# Patient Record
Sex: FEMALE | Employment: FULL TIME
[De-identification: names, ages, dates, MRNs, and addresses within clinical notes are randomized per-mention and may not be internally consistent; named-entity substitution may affect disease eponyms.]

---

## 2017-10-22 ENCOUNTER — HEALTH MAINTENANCE LETTER (OUTPATIENT)
Age: 37
End: 2017-10-22

## 2018-11-02 ENCOUNTER — RADIANT APPOINTMENT (OUTPATIENT)
Dept: GENERAL RADIOLOGY | Facility: CLINIC | Age: 38
End: 2018-11-02
Attending: PHYSICIAN ASSISTANT
Payer: COMMERCIAL

## 2018-11-02 ENCOUNTER — OFFICE VISIT (OUTPATIENT)
Dept: INTERNAL MEDICINE | Facility: CLINIC | Age: 38
End: 2018-11-02
Payer: COMMERCIAL

## 2018-11-02 VITALS
BODY MASS INDEX: 32.18 KG/M2 | OXYGEN SATURATION: 98 % | SYSTOLIC BLOOD PRESSURE: 118 MMHG | WEIGHT: 170.3 LBS | HEART RATE: 80 BPM | DIASTOLIC BLOOD PRESSURE: 70 MMHG | TEMPERATURE: 98.3 F

## 2018-11-02 DIAGNOSIS — S92.351A CLOSED DISPLACED FRACTURE OF FIFTH METATARSAL BONE OF RIGHT FOOT, INITIAL ENCOUNTER: Primary | ICD-10-CM

## 2018-11-02 DIAGNOSIS — S99.921A FOOT INJURY, RIGHT, INITIAL ENCOUNTER: ICD-10-CM

## 2018-11-02 PROCEDURE — 73630 X-RAY EXAM OF FOOT: CPT | Mod: RT

## 2018-11-02 PROCEDURE — 99214 OFFICE O/P EST MOD 30 MIN: CPT | Performed by: PHYSICIAN ASSISTANT

## 2018-11-02 RX ORDER — TRAMADOL HYDROCHLORIDE 50 MG/1
50 TABLET ORAL EVERY 6 HOURS PRN
Qty: 10 TABLET | Refills: 0 | Status: SHIPPED | OUTPATIENT
Start: 2018-11-02 | End: 2019-09-13

## 2018-11-02 NOTE — PROGRESS NOTES
SUBJECTIVE:   Elsie Gottlieb is a 38 year old female who presents to clinic today for the following health issues:      Patient is here today because she was going down the stairs this morning and twisted and heard a pop and now right foot is black and blue and swollen.     Musculoskeletal problem/pain      Duration: this am     Description  Location: right lateral foot     Intensity:  severe    Accompanying signs and symptoms: swelling and bruising    Very painful not able to bear weight     History  Previous similar problem: YES- fx left foot in 2014  Previous evaluation:  none    Precipitating or alleviating factors:  Trauma or overuse: YES  Aggravating factors include: walking    Therapies tried and outcome: nothing      Problem list and histories reviewed & adjusted, as indicated.  Additional history: as documented    Labs reviewed in EPIC    Reviewed and updated as needed this visit by clinical staff  Allergies  Meds       Reviewed and updated as needed this visit by Provider  Allergies  Meds         ROS:  Constitutional, HEENT, cardiovascular, pulmonary, gi and gu systems are negative, except as otherwise noted.    OBJECTIVE:     /70  Pulse 80  Temp 98.3  F (36.8  C) (Oral)  Wt 170 lb 4.8 oz (77.2 kg)  LMP 10/17/2018 (Approximate)  SpO2 98%  BMI 32.18 kg/m2  Body mass index is 32.18 kg/(m^2).  GENERAL: healthy, alert and no distress  RESP: lungs clear to auscultation - no rales, rhonchi or wheezes  CV: regular rates and rhythm and normal S1 S2, no S3 or S4  MS: right foot, swelling ecchymosis lateral 5th metatarsal area   SKIN: no suspicious lesions or rashes    Xray  Prelim- displaced fracture proximal 5th metatarsal     ASSESSMENT/PLAN:             1. Closed displaced fracture of fifth metatarsal bone of right foot, initial encounter    - order for DME; Equipment being ordered: crutches  And CAM walker boot  Dispense: 1 each; Refill: 0  - ORTHOPEDICS ADULT REFERRAL  - traMADol (ULTRAM) 50  MG tablet; Take 1 tablet (50 mg) by mouth every 6 hours as needed for severe pain  Dispense: 10 tablet; Refill: 0    2. Foot injury, right, initial encounter    - XR Foot Right G/E 3 Views; Future    Icing,  See ortho next week for further management.     25 minutes spent with patient > 50% of time on counseling and plan of care.      Meche Patino PA-C  [facility]

## 2018-11-02 NOTE — MR AVS SNAPSHOT
After Visit Summary   11/2/2018    Elsie Gottlieb    MRN: [id number]           Patient Information     Date Of Birth          1980        Visit Information        Provider Department      11/2/2018 2:20 PM Meche Patino PA-C [facility]        Today's Diagnoses     Closed displaced fracture of fifth metatarsal bone of right foot, initial encounter    -  1    Foot injury, right, initial encounter           Follow-ups after your visit        Additional Services     ORTHOPEDICS ADULT REFERRAL       Your provider has referred you to: [facility] [phone]   [email or web address]    Please be aware that coverage of these services is subject to the terms and limitations of your health insurance plan.  Call member services at your health plan with any benefit or coverage questions.      Please bring the following to your appointment:    >>   Any x-rays, CTs or MRIs which have been performed.  Contact the facility where they were done to arrange for  prior to your scheduled appointment.    >>   List of current medications   >>   This referral request   >>   Any documents/labs given to you for this referral                  Who to contact     If you have questions or need follow up information about today's clinic visit or your schedule please contact [facility] directly at [phone].  Normal or non-critical lab and imaging results will be communicated to you by MyChart, letter or phone within 4 business days after the clinic has received the results. If you do not hear from us within 7 days, please contact the clinic through MyChart or phone. If you have a critical or abnormal lab result, we will notify you by phone as soon as possible.  Submit refill requests through [employer] or call your pharmacy and they will forward the refill request to us. Please allow 3 business days for your refill to be  "completed.          Additional Information About Your Visit        [employer] Information     [employer] lets you send messages to your doctor, view your test results, renew your prescriptions, schedule appointments and more. To sign up, go to [email or web address] . Click on \"Log in\" on the left side of the screen, which will take you to the Welcome page. Then click on \"Sign up Now\" on the right side of the page.     You will be asked to enter the access code listed below, as well as some personal information. Please follow the directions to create your username and password.     Your access code is: [email or web address]  Expires: 2019  2:53 PM     Your access code will  in 90 days. If you need help or a new code, please call your [facility] clinic or [phone].        Care EveryWhere ID     This is your Care EveryWhere ID. This could be used by other organizations to access your [facility] medical records  [id number]        Your Vitals Were     Pulse Temperature Last Period Pulse Oximetry BMI (Body Mass Index)       80 98.3  F (36.8  C) (Oral) 10/17/2018 (Approximate) 98% 32.18 kg/m2        Blood Pressure from Last 3 Encounters:   18 118/70   16 122/76   10/15/15 105/55    Weight from Last 3 Encounters:   18 170 lb 4.8 oz (77.2 kg)   16 164 lb (74.4 kg)   10/14/15 170 lb (77.1 kg)              We Performed the Following     ORTHOPEDICS ADULT REFERRAL          Today's Medication Changes          These changes are accurate as of 18  2:53 PM.  If you have any questions, ask your nurse or doctor.               Start taking these medicines.        Dose/Directions    order for DME   Used for:  Closed displaced fracture of fifth metatarsal bone of right foot, initial encounter   Started by:  Meche Patino PA-C        Equipment being ordered: crutches And CAM walker boot   Quantity:  1 each   Refills:  0            Where to get your medicines      Some of these will need a paper " prescription and others can be bought over the counter.  Ask your nurse if you have questions.     Bring a paper prescription for each of these medications     order for DME                Primary Care Provider Office Phone # Fax #    Scott Hollingsworth [phone] [address] [place] [address]        Equal Access to Services     THEERSE MATTHEWS : Hadii aad ku hadasho Soomaali, waaxda luqadaha, qaybta kaalmada adeegyada, waxay sammyin hayaan adeeg kharash laailynn . So [facility] [phone].    ATENCIÓN: Si habla español, tiene a rosales disposición servicios gratuitos de asistencia lingüística. Llame al [phone].    We comply with applicable federal civil rights laws and [place] laws. We do not discriminate on the basis of race, color, national origin, age, disability, sex, sexual orientation, or gender identity.            Thank you!     Thank you for choosing [facility]  for your care. Our goal is always to provide you with excellent care. Hearing back from our patients is one way we can continue to improve our services. Please take a few minutes to complete the written survey that you may receive in the mail after your visit with us. Thank you!             Your Updated Medication List - Protect others around you: Learn how to safely use, store and throw away your medicines at www.disposemymeds.org.          This list is accurate as of 11/2/18  2:53 PM.  Always use your most recent med list.                   Brand Name Dispense Instructions for use Diagnosis    HYDROcodone-acetaminophen 5-325 MG per tablet    NORCO    30 tablet    Take 1-2 tablets by mouth every 4 hours as needed for other (Moderate to Severe Pain)    Acute appendicitis with localized peritonitis       multivitamin, therapeutic with minerals Tabs tablet      Take 1 tablet by mouth daily        order for DME     1 each    Equipment being ordered: crutches And CAM walker boot    Closed displaced  fracture of fifth metatarsal bone of right foot, initial encounter

## 2019-09-13 ENCOUNTER — ANCILLARY PROCEDURE (OUTPATIENT)
Dept: MAMMOGRAPHY | Facility: CLINIC | Age: 39
End: 2019-09-13
Attending: PHYSICIAN ASSISTANT
Payer: COMMERCIAL

## 2019-09-13 ENCOUNTER — OFFICE VISIT (OUTPATIENT)
Dept: INTERNAL MEDICINE | Facility: CLINIC | Age: 39
End: 2019-09-13
Payer: COMMERCIAL

## 2019-09-13 VITALS
OXYGEN SATURATION: 99 % | DIASTOLIC BLOOD PRESSURE: 70 MMHG | SYSTOLIC BLOOD PRESSURE: 106 MMHG | HEIGHT: 61 IN | BODY MASS INDEX: 31.68 KG/M2 | WEIGHT: 167.8 LBS | HEART RATE: 63 BPM | TEMPERATURE: 98.5 F

## 2019-09-13 DIAGNOSIS — Z13.1 SCREENING FOR DIABETES MELLITUS: ICD-10-CM

## 2019-09-13 DIAGNOSIS — Z13.220 LIPID SCREENING: ICD-10-CM

## 2019-09-13 DIAGNOSIS — Z12.31 VISIT FOR SCREENING MAMMOGRAM: ICD-10-CM

## 2019-09-13 DIAGNOSIS — Z00.00 ROUTINE GENERAL MEDICAL EXAMINATION AT A HEALTH CARE FACILITY: Primary | ICD-10-CM

## 2019-09-13 DIAGNOSIS — Z12.31 ENCOUNTER FOR SCREENING MAMMOGRAM FOR BREAST CANCER: ICD-10-CM

## 2019-09-13 DIAGNOSIS — E78.5 HYPERLIPIDEMIA LDL GOAL <100: ICD-10-CM

## 2019-09-13 DIAGNOSIS — Z11.4 ENCOUNTER FOR SCREENING FOR HIV: ICD-10-CM

## 2019-09-13 DIAGNOSIS — Z12.4 SCREENING FOR MALIGNANT NEOPLASM OF CERVIX: ICD-10-CM

## 2019-09-13 DIAGNOSIS — R41.3 MEMORY LOSS: ICD-10-CM

## 2019-09-13 DIAGNOSIS — Z23 NEED FOR PROPHYLACTIC VACCINATION AND INOCULATION AGAINST INFLUENZA: ICD-10-CM

## 2019-09-13 LAB
ALBUMIN SERPL-MCNC: 3.8 G/DL (ref 3.4–5)
ALP SERPL-CCNC: 75 U/L (ref 40–150)
ALT SERPL W P-5'-P-CCNC: 21 U/L (ref 0–50)
ANION GAP SERPL CALCULATED.3IONS-SCNC: 3 MMOL/L (ref 3–14)
AST SERPL W P-5'-P-CCNC: 8 U/L (ref 0–45)
BASOPHILS # BLD AUTO: 0 10E9/L (ref 0–0.2)
BASOPHILS NFR BLD AUTO: 0.2 %
BILIRUB SERPL-MCNC: 1.1 MG/DL (ref 0.2–1.3)
BUN SERPL-MCNC: 8 MG/DL (ref 7–30)
CALCIUM SERPL-MCNC: 9.1 MG/DL (ref 8.5–10.1)
CHLORIDE SERPL-SCNC: 107 MMOL/L (ref 94–109)
CHOLEST SERPL-MCNC: 316 MG/DL
CO2 SERPL-SCNC: 27 MMOL/L (ref 20–32)
CREAT SERPL-MCNC: 0.58 MG/DL (ref 0.52–1.04)
DIFFERENTIAL METHOD BLD: NORMAL
EOSINOPHIL # BLD AUTO: 0.1 10E9/L (ref 0–0.7)
EOSINOPHIL NFR BLD AUTO: 1.2 %
ERYTHROCYTE [DISTWIDTH] IN BLOOD BY AUTOMATED COUNT: 13.6 % (ref 10–15)
GFR SERPL CREATININE-BSD FRML MDRD: >90 ML/MIN/{1.73_M2}
GLUCOSE SERPL-MCNC: 92 MG/DL (ref 70–99)
HCT VFR BLD AUTO: 38.1 % (ref 35–47)
HDLC SERPL-MCNC: 55 MG/DL
HGB BLD-MCNC: 12.8 G/DL (ref 11.7–15.7)
LDLC SERPL CALC-MCNC: 233 MG/DL
LYMPHOCYTES # BLD AUTO: 1.6 10E9/L (ref 0.8–5.3)
LYMPHOCYTES NFR BLD AUTO: 38.6 %
MCH RBC QN AUTO: 29.5 PG (ref 26.5–33)
MCHC RBC AUTO-ENTMCNC: 33.6 G/DL (ref 31.5–36.5)
MCV RBC AUTO: 88 FL (ref 78–100)
MONOCYTES # BLD AUTO: 0.3 10E9/L (ref 0–1.3)
MONOCYTES NFR BLD AUTO: 8 %
NEUTROPHILS # BLD AUTO: 2.2 10E9/L (ref 1.6–8.3)
NEUTROPHILS NFR BLD AUTO: 52 %
NONHDLC SERPL-MCNC: 261 MG/DL
PLATELET # BLD AUTO: 243 10E9/L (ref 150–450)
POTASSIUM SERPL-SCNC: 3.8 MMOL/L (ref 3.4–5.3)
PROT SERPL-MCNC: 7.3 G/DL (ref 6.8–8.8)
RBC # BLD AUTO: 4.34 10E12/L (ref 3.8–5.2)
SODIUM SERPL-SCNC: 137 MMOL/L (ref 133–144)
TRIGL SERPL-MCNC: 141 MG/DL
VIT B12 SERPL-MCNC: 517 PG/ML (ref 193–986)
WBC # BLD AUTO: 4.3 10E9/L (ref 4–11)

## 2019-09-13 PROCEDURE — 87389 HIV-1 AG W/HIV-1&-2 AB AG IA: CPT | Performed by: PHYSICIAN ASSISTANT

## 2019-09-13 PROCEDURE — 90686 IIV4 VACC NO PRSV 0.5 ML IM: CPT | Performed by: PHYSICIAN ASSISTANT

## 2019-09-13 PROCEDURE — 36415 COLL VENOUS BLD VENIPUNCTURE: CPT | Performed by: PHYSICIAN ASSISTANT

## 2019-09-13 PROCEDURE — 99214 OFFICE O/P EST MOD 30 MIN: CPT | Mod: 25 | Performed by: PHYSICIAN ASSISTANT

## 2019-09-13 PROCEDURE — G0123 SCREEN CERV/VAG THIN LAYER: HCPCS | Performed by: PHYSICIAN ASSISTANT

## 2019-09-13 PROCEDURE — 90471 IMMUNIZATION ADMIN: CPT | Performed by: PHYSICIAN ASSISTANT

## 2019-09-13 PROCEDURE — 77067 SCR MAMMO BI INCL CAD: CPT | Mod: TC

## 2019-09-13 PROCEDURE — 86780 TREPONEMA PALLIDUM: CPT | Performed by: PHYSICIAN ASSISTANT

## 2019-09-13 PROCEDURE — 99396 PREV VISIT EST AGE 40-64: CPT | Mod: 25 | Performed by: PHYSICIAN ASSISTANT

## 2019-09-13 PROCEDURE — 82607 VITAMIN B-12: CPT | Performed by: PHYSICIAN ASSISTANT

## 2019-09-13 PROCEDURE — 80061 LIPID PANEL: CPT | Performed by: PHYSICIAN ASSISTANT

## 2019-09-13 PROCEDURE — 80053 COMPREHEN METABOLIC PANEL: CPT | Performed by: PHYSICIAN ASSISTANT

## 2019-09-13 PROCEDURE — 85025 COMPLETE CBC W/AUTO DIFF WBC: CPT | Performed by: PHYSICIAN ASSISTANT

## 2019-09-13 PROCEDURE — 87624 HPV HI-RISK TYP POOLED RSLT: CPT | Performed by: PHYSICIAN ASSISTANT

## 2019-09-13 ASSESSMENT — MIFFLIN-ST. JEOR: SCORE: 1363.52

## 2019-09-13 NOTE — NURSING NOTE
"Chief Complaint   Patient presents with     Physical     wants to discuss sleep issues     /70   Pulse 63   Temp 98.5  F (36.9  C) (Oral)   Ht 1.549 m (5' 1\")   Wt 76.1 kg (167 lb 12.8 oz)   SpO2 99%   BMI 31.71 kg/m   Estimated body mass index is 31.71 kg/m  as calculated from the following:    Height as of this encounter: 1.549 m (5' 1\").    Weight as of this encounter: 76.1 kg (167 lb 12.8 oz).        Health Maintenance due pending provider review:  Pap Smear    Discuss with provider    Moni Osborn CMA  "

## 2019-09-13 NOTE — PROGRESS NOTES
SUBJECTIVE:   CC: Elsie Gottlieb is an 39 year old woman who presents for preventive health visit.     Healthy Habits:     Getting at least 3 servings of Calcium per day:  Yes    Bi-annual eye exam:  NO    Dental care twice a year:  Yes    Sleep apnea or symptoms of sleep apnea:  None    Diet:  Regular (no restrictions)    Frequency of exercise:  2-3 days/week    Duration of exercise:  Greater than 60 minutes    Taking medications regularly:  Yes    Medication side effects:  None    PHQ-2 Total Score: 0    Additional concerns today:  No    Patient reports concerns for forgetfulness. She notes at work she is forgetting things. She notes one day she was happy to be done with work early to make it to her ariel class. She drove home instead and it was hours before she recalled she missed her ariel class. She notes feeling warm,difficulty sleeping and dry skin as other symptoms.     Today's PHQ-2 Score:   PHQ-2 ( 1999 Pfizer) 9/13/2019   Q1: Little interest or pleasure in doing things 0   Q2: Feeling down, depressed or hopeless 0   PHQ-2 Score 0   Q1: Little interest or pleasure in doing things Not at all   Q2: Feeling down, depressed or hopeless Not at all   PHQ-2 Score 0       Abuse: Current or Past(Physical, Sexual or Emotional)- NO  Do you feel safe in your environment? YES    Social History     Tobacco Use     Smoking status: Former Smoker     Types: Cigarettes     Smokeless tobacco: Never Used     Tobacco comment: tried smoking as a teenager but never regularly   Substance Use Topics     Alcohol use: No     Alcohol/week: 0.0 oz     If you drink alcohol do you typically have >3 drinks per day or >7 drinks per week? Not applicable    Alcohol Use 9/13/2019   Prescreen: >3 drinks/day or >7 drinks/week? Not Applicable   Prescreen: >3 drinks/day or >7 drinks/week? -   No flowsheet data found.    Reviewed orders with patient.  Reviewed health maintenance and updated orders accordingly - Yes    Mammogram Screening: Patient  "under age 50, mutual decision reflected in health maintenance.       Pertinent mammograms are reviewed under the imaging tab.  History of abnormal Pap smear: NO - age 30-65 PAP every 5 years with negative HPV co-testing recommended  PAP / HPV 8/20/2014   PAP NIL     Reviewed and updated as needed this visit by clinical staff  Tobacco  Allergies  Meds  Med Hx  Surg Hx  Fam Hx  Soc Hx        Reviewed and updated as needed this visit by Provider  Tobacco  Surg Hx  Fam Hx  Soc Hx         Review of Systems  CONSTITUTIONAL: NEGATIVE for fever, chills, change in weight  INTEGUMENTARU/SKIN: NEGATIVE for worrisome rashes, moles or lesions  EYES: NEGATIVE for vision changes or irritation  ENT: NEGATIVE for ear, mouth and throat problems  RESP: NEGATIVE for significant cough or SOB  BREAST: NEGATIVE for masses, tenderness or discharge  CV: NEGATIVE for chest pain, palpitations or peripheral edema  GI: NEGATIVE for nausea, abdominal pain, heartburn, or change in bowel habits  : NEGATIVE for unusual urinary or vaginal symptoms. Periods are regular.  MUSCULOSKELETAL: NEGATIVE for significant arthralgias or myalgia  NEURO: NEGATIVE for weakness, dizziness or paresthesias  PSYCHIATRIC: NEGATIVE for changes in mood or affect     OBJECTIVE:   /70   Pulse 63   Temp 98.5  F (36.9  C) (Oral)   Ht 1.549 m (5' 1\")   Wt 76.1 kg (167 lb 12.8 oz)   SpO2 99%   BMI 31.71 kg/m    Physical Exam  GENERAL: healthy, alert and no distress  EYES: Eyes grossly normal to inspection, PERRL and conjunctivae and sclerae normal  HENT: ear canals and TM's normal, nose and mouth without ulcers or lesions  NECK: no adenopathy, no asymmetry, masses, or scars and thyroid normal to palpation  RESP: lungs clear to auscultation - no rales, rhonchi or wheezes  CV: regular rate and rhythm, normal S1 S2, no S3 or S4, no murmur, click or rub, no peripheral edema and peripheral pulses strong  ABDOMEN: soft, nontender, no hepatosplenomegaly, no " masses and bowel sounds normal  MS: no gross musculoskeletal defects noted, no edema  SKIN: no suspicious lesions or rashes  NEURO: Normal strength and tone, mentation intact and speech normal  PSYCH: mentation appears normal, affect normal/bright    Diagnostic Test Results:  Labs reviewed in Epic    ASSESSMENT/PLAN:   1. Routine general medical examination at a health care facility  - reviewed PMH and health maintenance     2. Encounter for screening mammogram for breast cancer  - reviewed mammogram guidelines, patient wanting to start now vs wait     3. Encounter for screening for HIV  - HIV Antigen Antibody Combo    4. Lipid screening  - Lipid panel reflex to direct LDL Fasting    5. Screening for malignant neoplasm of cervix  - Pap imaged thin layer screen with HPV - recommended age 30 - 65 years (select HPV order below)  - HPV High Risk Types DNA Cervical    6. Screening for diabetes mellitus  - CMP as below     7. Memory loss  - further work up as below along as referral to neurology for further evaluation and treatment   - Vitamin B12  - Comprehensive metabolic panel  - CBC with platelets and differential  - Treponema Abs w Reflex to RPR and Titer  - NEUROLOGY ADULT REFERRAL    8. Need for prophylactic vaccination and inoculation against influenza  - INFLUENZA VACCINE IM > 6 MONTHS VALENT IIV4 [38845]  - Vaccine Administration, Initial [63187]    Tiffanie Alves PA-C  [facility]

## 2019-09-13 NOTE — PROGRESS NOTES
"   SUBJECTIVE:   CC: Elsie Gottlieb is an 39 year old woman who presents for preventive health visit.     Healthy Habits:    Do you get at least three servings of calcium containing foods daily (dairy, green leafy vegetables, etc.)? { :927877::\"yes\"}    Amount of exercise or daily activities, outside of work: { :474439}    Problems taking medications regularly { :791283::\"No\"}    Medication side effects: { :823619::\"No\"}    Have you had an eye exam in the past two years? { :023018}    Do you see a dentist twice per year? { :091164}    Do you have sleep apnea, excessive snoring or daytime drowsiness?{ :258116}  {Outside tests to abstract? :352563}    {additional problems to add (Optional):283602}    Today's PHQ-2 Score:   PHQ-2 ( 1999 Pfizer) 9/13/2019 5/18/2016   Q1: Little interest or pleasure in doing things 0 0   Q2: Feeling down, depressed or hopeless 0 0   PHQ-2 Score 0 0   Q1: Little interest or pleasure in doing things Not at all -   Q2: Feeling down, depressed or hopeless Not at all -   PHQ-2 Score 0 -     {PHQ-2 LOOK IN ASSESSMENTS (Optional) :569395}  Abuse: Current or Past(Physical, Sexual or Emotional)- {YES/NO/NA:112883}  Do you feel safe in your environment? {YES/NO/NA:032816}    Social History     Tobacco Use     Smoking status: Former Smoker     Types: Cigarettes     Smokeless tobacco: Never Used     Tobacco comment: tried smoking as a teenager but never regularly   Substance Use Topics     Alcohol use: No     Alcohol/week: 0.0 oz     If you drink alcohol do you typically have >3 drinks per day or >7 drinks per week? {ETOH :149274}                     Reviewed orders with patient.  Reviewed health maintenance and updated orders accordingly - {Yes/No:590596::\"Yes\"}  {Chronicprobdata (Optional):803333}    {Mammo Decision Support (Optional):040176}    Pertinent mammograms are reviewed under the imaging tab.  History of abnormal Pap smear: {PAP HX:926841}  PAP / HPV 8/20/2014   PAP NIL     Reviewed and " "updated as needed this visit by clinical staff  Tobacco  Allergies  Med Hx  Surg Hx  Fam Hx  Soc Hx        Reviewed and updated as needed this visit by Provider        {HISTORY OPTIONS (Optional):866073}    ROS:  { :075775}    OBJECTIVE:   There were no vitals taken for this visit.  EXAM:  {Exam Choices:390466}    {Diagnostic Test Results (Optional):737243::\"Diagnostic Test Results:\",\"Labs reviewed in Epic\"}    ASSESSMENT/PLAN:   {Diag Picklist:451905}    COUNSELING:   {FEMALE COUNSELING MESSAGES:955480::\"Reviewed preventive health counseling, as reflected in patient instructions\"}    Estimated body mass index is 32.18 kg/m  as calculated from the following:    Height as of 5/18/16: 1.549 m (5' 1\").    Weight as of 11/2/18: 77.2 kg (170 lb 4.8 oz).    {Weight Management Plan (ACO) Complete if BMI is abnormal-  Ages 18-64  BMI >24.9.  Age 65+ with BMI <23 or >30 (Optional):158236}     reports that she has quit smoking. Her smoking use included cigarettes. She has never used smokeless tobacco.  {Tobacco Cessation -- Complete if patient is a smoker (Optional):661381}    Counseling Resources:  ATP IV Guidelines  Pooled Cohorts Equation Calculator  Breast Cancer Risk Calculator  FRAX Risk Assessment  ICSI Preventive Guidelines  Dietary Guidelines for Americans, 2010  USDA's MyPlate  ASA Prophylaxis  Lung CA Screening    Tiffanie Alves PA-C  [facility]  "

## 2019-09-13 NOTE — LETTER
September 23, 2019    Elsie Gottlieb  [address]  [place] [address]    Dear ,  This letter is regarding your recent Pap smear (cervical cancer screening) and Human Papillomavirus (HPV) test.  We are happy to inform you that your Pap smear result is normal. Cervical cancer is closely linked with certain types of HPV. Your results showed no evidence of high-risk HPV.  We recommend you have your next PAP smear and HPV test in 5 years.  You will still need to return to the clinic every year for an annual exam and other preventive tests.  If you have additional questions regarding this result, please call our registered nurse, Christie at [phone].  Sincerely,    Tiffanie Alves PA-C //[email or web address]

## 2019-09-15 LAB — T PALLIDUM AB SER QL: NONREACTIVE

## 2019-09-16 LAB — HIV 1+2 AB+HIV1 P24 AG SERPL QL IA: NONREACTIVE

## 2019-09-18 LAB
COPATH REPORT: NORMAL
FINAL DIAGNOSIS: NORMAL
HPV HR 12 DNA CVX QL NAA+PROBE: NEGATIVE
HPV16 DNA SPEC QL NAA+PROBE: NEGATIVE
HPV18 DNA SPEC QL NAA+PROBE: NEGATIVE
PAP: NORMAL
SPECIMEN DESCRIPTION: NORMAL
SPECIMEN SOURCE CVX/VAG CYTO: NORMAL

## 2019-09-25 ENCOUNTER — TELEPHONE (OUTPATIENT)
Dept: INTERNAL MEDICINE | Facility: CLINIC | Age: 39
End: 2019-09-25

## 2019-09-25 RX ORDER — ATORVASTATIN CALCIUM 40 MG/1
40 TABLET, FILM COATED ORAL DAILY
Qty: 90 TABLET | Refills: 3 | Status: SHIPPED | OUTPATIENT
Start: 2019-09-25 | End: 2020-11-23

## 2019-10-16 NOTE — PROGRESS NOTES
INITIAL NEUROLOGY CONSULTATION    DATE OF VISIT: 10/16/2019  CLINIC LOCATION: [facility]  MRN: [id number]  PATIENT NAME: Elsie Gottlieb  YOB: 1980    PRIMARY CARE PROVIDER: Scott Hollingsworth MD     REASON FOR VISIT:   Chief Complaint   Patient presents with     Memory Loss     started last year she would forget things and words      HISTORY OF PRESENT ILLNESS:                                                    Ms. Elsie Gottlieb is 39 year old ambidextrous female patient with past medical history of hyperlipidemia, who was seen in consultation today requested by Tiffanie Alves PA-C, for memory difficulty.    Per patient's report, she noticed increased forgetfulness for the recent events over the last year.  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  She repeatedly forgot her jacket, cell phone, and other belongings at work.  One time she forgot to put away the expensive knife that disappeared after that.  On one occasion she forgot that she had a class after work and realized it half hour after it started.  She also repeatedly asked the same questions at work regarding recipes and other processes she did not remember.  Now she does not do it because she was able to eventually learn needed information.  Her  and her friends do not notice any problems, but at work she was commented by supervisor and her coworkers regarding her memory issues.  She has word finding difficulties.  No past due bills (her  is in charge of their finances).  She continues to drive without any difficulties.  She denies any safety concerns at home, including left on stove burners, lights, water facets, or keys outside the doors.  She denies any significant mood or personality changes, but admits that work change was quite stressful.  At the present time rates her stress level as mild.  Denies any focal neurological symptoms.  No prior history of significant  brain injuries, seizures, or CNS infections.    Recent labs from September 2019 include elevated LDL (233), normal vitamin B12 (517), unremarkable CMP, normal CBC, and non-reactive treponema/HIV antibodies.  No prior TSH level.    No previous brain imaging.  No additional useful information is available in Care Everywhere, which was reviewed.    Review of Systems - the patient endorses insomnia, weight gain, and depression (denies later symptom on further questioning).  All of them have been previously discussed with other medical providers.  Otherwise, she denies any other complaints on 14-point comprehensive review of systems.  PAST MEDICAL/SURGICAL HISTORY:                                                    I personally reviewed patient's past medical and surgical history with the patient at today's visit.  Patient Active Problem List   Diagnosis     Hyperlipidemia LDL goal <160     PPD positive     Closed fracture of metatarsal bone     Acute appendicitis with localized peritonitis     Past Medical History:   Diagnosis Date     Hyperlipidemia LDL goal <160      Positive PPD      Past Surgical History:   Procedure Laterality Date     CHOLECYSTECTOMY  8/2011     GYN SURGERY  6/2011    tubal ligation     LAPAROSCOPIC APPENDECTOMY N/A 10/14/2015    Procedure: LAPAROSCOPIC APPENDECTOMY;  Surgeon: Wicho Segal MD;  Location:  OR     MEDICATIONS:                                                    I personally reviewed patient's medications and allergies with the patient at today's visit:  atorvastatin (LIPITOR) 40 MG tablet, Take 1 tablet (40 mg) by mouth daily  multivitamin, therapeutic with minerals (THERA-VIT-M) TABS, Take 1 tablet by mouth daily.  ALLERGIES:                                                    No Known Allergies  FAMILY/SOCIAL HISTORY:                                                    Family and social history was reviewed with the patient at today's visit.  No family history of neurological  disorders.  Medical history of her mother and father is unknown.  Never smoker.  Denies current alcohol and recreational drug use.  REVIEW OF SYSTEMS:                                                    Patient has completed a [facility] Patient Health History, including a 14-system review, which was personally reviewed, and pertinent positives are listed in HPI. She denies any additional problems on the further questioning.  EXAM:                                                    VITAL SIGNS:   /60 (BP Location: Left arm, Patient Position: Sitting, Cuff Size: Adult Regular)   Pulse 76   Temp 98.2  F (36.8  C) (Oral)   Wt 76.7 kg (169 lb)   SpO2 99%   BMI 31.93 kg/m    [place]rice Cognitive Assessment:    Fabrice Cognitive Assessment (MOCA)  Visuospatial/Executive : 4  Naming: 3  Attention - Digits: 1  Attention - Letters: 0  Attention - Subtraction: 3  Language - Repeat: 2  Language - Fluency : 0  Abstraction: 2  Delayed Recall: 0  Orientation: 6  Education: 1  MOCA Score: 22     [place] Cognitive Assessment Score:  MOCA Score: 22/30.     General: pt is in NAD, cooperative.  Skin: normal turgor, moist mucous membranes, no lesions/rashes noticed.  HEENT: ATNC, EOMI, PERRL, white sclera, normal conjunctiva, no nystagmus or ptosis. No carotid bruits bilaterally.  Respiratory: lung sounds clear to auscultation bilaterally, no crackles, wheezes, rhonchi. Symmetric lung excursion, no accessory respiratory muscle use.  Cardiovascular: normal S1/S2, no murmurs/rubs/gallops.   Abdomen: Not distended.  : deferred.    Neurological:  Mental: alert, follows commands, MoCA as per above, no aphasia or dysarthria. Fund of knowledge is appropriate for age.  Cranial Nerves:  CN II: visual acuity - able to accurately count fingers with each eye. Visual fields intact, fundi: discs sharp, no papilledema and normal vessels bilaterally.  CN III, IV, VI: EOM intact, pupils equal and reactive  CN V: facial sensation  nl  CN VII: face symmetric, no facial droop  CN VIII: hearing normal  CN IX: palate elevation symmetric, uvula at midline  CN XI SCM normal, shoulder shrug nl  CN XII: tongue midline  Motor: Strength: 5/5 in all major groups of all extremities. Normal tone. No abnormal movements. No pronator drift b/l.  Reflexes: Triceps, biceps, brachioradialis, patellar, and achilles reflexes normal and symmetric. No clonus noted. Toes are down-going b/l.   Sensory: temperature, light touch, pinprick, and vibration intact. Romberg: negative.  Coordination: FNF and heel-shin tests intact b/l.   Gait:  Normal, able to tandem, toe, and heel walk.  DATA:   LABS/IMAGING/OTHER STUDIES: I reviewed pertinent medical records, including Care Everywhere, as detailed in the history of present illness.  ASSESSMENT AND PLAN:      ASSESSMENT: Elsie Gottlieb is a 39 year old female patient with past medical history of hyperlipidemia, who presents with memory concerns over the last year after the relocation at her job.    We had a detailed discussion with the patient regarding her presenting complaints.  Her MoCA today is 22/30 (question attention and concentration).  The rest of neurological exam is non-focal.  Recent Treponema pallidum serology and vitamin B12 are normal.  TSH was not checked.  No prior brain imaging.    We discussed with the patient that cognitive dysfunction/memory difficulties could be seen with elevated stress/anxiety, other mental health conditions, vitamin deficiencies, thyroid dysfunction, and structural or vascular brain lesions.  The likelihood of neurodegenerative disease is very low.  For further diagnostic clarification I ordered additional screening labs and brain MRI.  If unrevealing, I would be inclined to observe her symptoms and repeat cognitive testing in approximately 6 months while she addresses her elevated anxiety.    DIAGNOSES:    ICD-10-CM    1. Cognitive complaints R41.9 MR Brain w/o Contrast     TSH with  free T4 reflex     Vitamin B1 whole blood     PLAN: At today's visit we thoroughly discussed various diagnostic possibilities for patient's symptoms, necessary evaluation, and the plan, which includes:  Orders Placed This Encounter   Procedures     MR Brain w/o Contrast     TSH with free T4 reflex     Vitamin B1 whole blood     No new medications.    I counseled the patient to stay physically and mentally active with particular emphasis on daily mentally stimulating activities of  her choice (such as crosswords, puzzles, sudoku, etc.), stretching exercises, walking, and healthy eating.     Additional recommendations after the work-up.    Next follow-up appointment is in the next 2-4 weeks or earlier if needed.    Total Time:  83 minutes with > 50% spent counseling the patient on stated above assessment and recommendations, including nature of the diagnosis, needed w/u, and proposed plan.  Additional time was used to answer questions regarding patient's symptoms, my recommendations, and the plan.    Jose Pete MD  / Neurology  [place]  (Chart documentation was completed in part with Dragon voice-recognition software. Even though reviewed, some grammatical, spelling, and word errors may remain.)

## 2019-11-13 ENCOUNTER — OFFICE VISIT (OUTPATIENT)
Dept: NEUROLOGY | Facility: CLINIC | Age: 39
End: 2019-11-13
Payer: COMMERCIAL

## 2019-11-13 VITALS
DIASTOLIC BLOOD PRESSURE: 60 MMHG | OXYGEN SATURATION: 99 % | SYSTOLIC BLOOD PRESSURE: 110 MMHG | WEIGHT: 169 LBS | HEART RATE: 76 BPM | TEMPERATURE: 98.2 F | BODY MASS INDEX: 31.93 KG/M2

## 2019-11-13 DIAGNOSIS — R41.9 COGNITIVE COMPLAINTS: Primary | ICD-10-CM

## 2019-11-13 PROCEDURE — 36415 COLL VENOUS BLD VENIPUNCTURE: CPT | Performed by: PSYCHIATRY & NEUROLOGY

## 2019-11-13 PROCEDURE — 84443 ASSAY THYROID STIM HORMONE: CPT | Performed by: PSYCHIATRY & NEUROLOGY

## 2019-11-13 PROCEDURE — 84425 ASSAY OF VITAMIN B-1: CPT | Mod: 90 | Performed by: PSYCHIATRY & NEUROLOGY

## 2019-11-13 PROCEDURE — 99000 SPECIMEN HANDLING OFFICE-LAB: CPT | Performed by: PSYCHIATRY & NEUROLOGY

## 2019-11-13 PROCEDURE — 99205 OFFICE O/P NEW HI 60 MIN: CPT | Performed by: PSYCHIATRY & NEUROLOGY

## 2019-11-13 ASSESSMENT — MONTREAL COGNITIVE ASSESSMENT (MOCA)
7. [VIGILENCE] TAP WHEN HEARING DESIGNATED LETTER: 0
12. MEMORY INDEX SCORE: 0
13. ORIENTATION SUBSCORE: 6
WHAT IS THE TOTAL SCORE (OUT OF 30): 22
11. FOR EACH PAIR OF WORDS, WHAT CATEGORY DO THEY BELONG TO (OUT OF 2): 2
WHAT LEVEL OF EDUCATION WAS ATTAINED: 1
6. READ LIST OF DIGITS [FORWARD/BACKWARD]: 1
10. [FLUENCY] NAME WORDS STARTING WITH DESIGNATED LETTER: 0
9. REPEAT EACH SENTENCE: 2
4. NAME EACH OF THE THREE ANIMALS SHOWN: 3
8. SERIAL SUBTRACTION OF 7S: 3
VISUOSPATIAL/EXECUTIVE SUBSCORE: 4

## 2019-11-13 NOTE — PATIENT INSTRUCTIONS
AFTER VISIT SUMMARY (AVS):    At today's visit we thoroughly discussed various diagnostic possibilities for your symptoms, necessary evaluation, and the plan, which includes:  Orders Placed This Encounter   Procedures     MR Brain w/o Contrast     TSH with free T4 reflex     Vitamin B1 whole blood     No new medications.    Stay physically and mentally active with particular emphasis on daily mentally stimulating activities of your choice (such as crosswords, puzzles, sudoku, etc.), stretching exercises, walking, and healthy eating.     Additional recommendations after the work-up.    Next follow-up appointment is in the next 2-4 weeks or earlier if needed.    Please do not hesitate to call me with any questions or concerns.    Thanks.

## 2019-11-13 NOTE — LETTER
11/13/2019         RE: Elsie Gottlieb  [address]  [place] [address]        Dear Colleague,    Thank you for referring your patient, Elsie Gottlieb, to the [facility]. Please see a copy of my visit note below.    INITIAL NEUROLOGY CONSULTATION    DATE OF VISIT: 10/16/2019  CLINIC LOCATION: [facility]  MRN: [id number]  PATIENT NAME: Elsie Gottlieb  YOB: 1980    PRIMARY CARE PROVIDER: Scott Hollingsworth MD     REASON FOR VISIT:   Chief Complaint   Patient presents with     Memory Loss     started last year she would forget things and words      HISTORY OF PRESENT ILLNESS:                                                    Ms. Elsie Gottlieb is 39 year old ambidextrous female patient with past medical history of hyperlipidemia, who was seen in consultation today requested by Tiffanie Alves PA-C, for memory difficulty.    Per patient's report, she noticed increased forgetfulness for the recent events over the last year.  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  She repeatedly forgot her jacket, cell phone, and other belongings at work.  One time she forgot to put away the expensive knife that disappeared after that.  On one occasion she forgot that she had a class after work and realized it half hour after it started.  She also repeatedly asked the same questions at work regarding recipes and other processes she did not remember.  Now she does not do it because she was able to eventually learn needed information.  Her  and her friends do not notice any problems, but at work she was commented by supervisor and her coworkers regarding her memory issues.  She has word finding difficulties.  No past due bills (her  is in charge of their finances).  She continues to drive without any difficulties.  She denies any safety concerns at home, including left on stove burners, lights, water facets, or keys  outside the doors.  She denies any significant mood or personality changes, but admits that work change was quite stressful.  At the present time rates her stress level as mild.  Denies any focal neurological symptoms.  No prior history of significant brain injuries, seizures, or CNS infections.    Recent labs from September 2019 include elevated LDL (233), normal vitamin B12 (517), unremarkable CMP, normal CBC, and non-reactive treponema/HIV antibodies.  No prior TSH level.    No previous brain imaging.  No additional useful information is available in Care Everywhere, which was reviewed.    Review of Systems - the patient endorses insomnia, weight gain, and depression (denies later symptom on further questioning).  All of them have been previously discussed with other medical providers.  Otherwise, she denies any other complaints on 14-point comprehensive review of systems.  PAST MEDICAL/SURGICAL HISTORY:                                                    I personally reviewed patient's past medical and surgical history with the patient at today's visit.  Patient Active Problem List   Diagnosis     Hyperlipidemia LDL goal <160     PPD positive     Closed fracture of metatarsal bone     Acute appendicitis with localized peritonitis     Past Medical History:   Diagnosis Date     Hyperlipidemia LDL goal <160      Positive PPD      Past Surgical History:   Procedure Laterality Date     CHOLECYSTECTOMY  8/2011     GYN SURGERY  6/2011    tubal ligation     LAPAROSCOPIC APPENDECTOMY N/A 10/14/2015    Procedure: LAPAROSCOPIC APPENDECTOMY;  Surgeon: Wicho Segal MD;  Location:  OR     MEDICATIONS:                                                    I personally reviewed patient's medications and allergies with the patient at today's visit:  atorvastatin (LIPITOR) 40 MG tablet, Take 1 tablet (40 mg) by mouth daily  multivitamin, therapeutic with minerals (THERA-VIT-M) TABS, Take 1 tablet by mouth daily.  ALLERGIES:                                                     No Known Allergies  FAMILY/SOCIAL HISTORY:                                                    Family and social history was reviewed with the patient at today's visit.  No family history of neurological disorders.  Medical history of her mother and father is unknown.  Never smoker.  Denies current alcohol and recreational drug use.  REVIEW OF SYSTEMS:                                                    Patient has completed a [facility] Patient Health History, including a 14-system review, which was personally reviewed, and pertinent positives are listed in HPI. She denies any additional problems on the further questioning.  EXAM:                                                    VITAL SIGNS:   /60 (BP Location: Left arm, Patient Position: Sitting, Cuff Size: Adult Regular)   Pulse 76   Temp 98.2  F (36.8  C) (Oral)   Wt 76.7 kg (169 lb)   SpO2 99%   BMI 31.93 kg/m     Fabrice Cognitive Assessment:    [place] Cognitive Assessment (MOCA)  Visuospatial/Executive : 4  Naming: 3  Attention - Digits: 1  Attention - Letters: 0  Attention - Subtraction: 3  Language - Repeat: 2  Language - Fluency : 0  Abstraction: 2  Delayed Recall: 0  Orientation: 6  Education: 1  MOCA Score: 22     [place] Cognitive Assessment Score:  MOCA Score: 22/30.     General: pt is in NAD, cooperative.  Skin: normal turgor, moist mucous membranes, no lesions/rashes noticed.  HEENT: ATNC, EOMI, PERRL, white sclera, normal conjunctiva, no nystagmus or ptosis. No carotid bruits bilaterally.  Respiratory: lung sounds clear to auscultation bilaterally, no crackles, wheezes, rhonchi. Symmetric lung excursion, no accessory respiratory muscle use.  Cardiovascular: normal S1/S2, no murmurs/rubs/gallops.   Abdomen: Not distended.  : deferred.    Neurological:  Mental: alert, follows commands, MoCA as per above, no aphasia or dysarthria. Fund of knowledge is appropriate for  age.  Cranial Nerves:  CN II: visual acuity - able to accurately count fingers with each eye. Visual fields intact, fundi: discs sharp, no papilledema and normal vessels bilaterally.  CN III, IV, VI: EOM intact, pupils equal and reactive  CN V: facial sensation nl  CN VII: face symmetric, no facial droop  CN VIII: hearing normal  CN IX: palate elevation symmetric, uvula at midline  CN XI SCM normal, shoulder shrug nl  CN XII: tongue midline  Motor: Strength: 5/5 in all major groups of all extremities. Normal tone. No abnormal movements. No pronator drift b/l.  Reflexes: Triceps, biceps, brachioradialis, patellar, and achilles reflexes normal and symmetric. No clonus noted. Toes are down-going b/l.   Sensory: temperature, light touch, pinprick, and vibration intact. Romberg: negative.  Coordination: FNF and heel-shin tests intact b/l.   Gait:  Normal, able to tandem, toe, and heel walk.  DATA:   LABS/IMAGING/OTHER STUDIES: I reviewed pertinent medical records, including Care Everywhere, as detailed in the history of present illness.  ASSESSMENT AND PLAN:      ASSESSMENT: Elsie Gottlieb is a 39 year old female patient with past medical history of hyperlipidemia, who presents with memory concerns over the last year after the relocation at her job.    We had a detailed discussion with the patient regarding her presenting complaints.  Her MoCA today is 22/30 (question attention and concentration).  The rest of neurological exam is non-focal.  Recent Treponema pallidum serology and vitamin B12 are normal.  TSH was not checked.  No prior brain imaging.    We discussed with the patient that cognitive dysfunction/memory difficulties could be seen with elevated stress/anxiety, other mental health conditions, vitamin deficiencies, thyroid dysfunction, and structural or vascular brain lesions.  The likelihood of neurodegenerative disease is very low.  For further diagnostic clarification I ordered additional screening labs and  brain MRI.  If unrevealing, I would be inclined to observe her symptoms and repeat cognitive testing in approximately 6 months while she addresses her elevated anxiety.    DIAGNOSES:    ICD-10-CM    1. Cognitive complaints R41.9 MR Brain w/o Contrast     TSH with free T4 reflex     Vitamin B1 whole blood     PLAN: At today's visit we thoroughly discussed various diagnostic possibilities for patient's symptoms, necessary evaluation, and the plan, which includes:  Orders Placed This Encounter   Procedures     MR Brain w/o Contrast     TSH with free T4 reflex     Vitamin B1 whole blood     No new medications.    I counseled the patient to stay physically and mentally active with particular emphasis on daily mentally stimulating activities of   her choice (such as crosswords, puzzles, sudoku, etc.), stretching exercises, walking, and healthy eating.     Additional recommendations after the work-up.    Next follow-up appointment is in the next 2-4 weeks or earlier if needed.    Total Time:  83 minutes with > 50% spent counseling the patient on stated above assessment and recommendations, including nature of the diagnosis, needed w/u, and proposed plan.  Additional time was used to answer questions regarding patient's symptoms, my recommendations, and the plan.    Jose Pete MD  / Neurology  [place]  (Chart documentation was completed in part with Dragon voice-recognition software. Even though reviewed, some grammatical, spelling, and word errors may remain.)            Again, thank you for allowing me to participate in the care of your patient.        Sincerely,        Jose Pete MD

## 2019-11-14 LAB — TSH SERPL DL<=0.005 MIU/L-ACNC: 1.2 MU/L (ref 0.4–4)

## 2019-11-15 ENCOUNTER — HOSPITAL ENCOUNTER (OUTPATIENT)
Dept: MRI IMAGING | Facility: CLINIC | Age: 39
Discharge: HOME | End: 2019-11-15
Attending: PSYCHIATRY & NEUROLOGY | Admitting: PSYCHIATRY & NEUROLOGY
Payer: COMMERCIAL

## 2019-11-15 DIAGNOSIS — R41.9 COGNITIVE COMPLAINTS: ICD-10-CM

## 2019-11-15 PROCEDURE — 70551 MRI BRAIN STEM W/O DYE: CPT

## 2019-11-17 LAB — VIT B1 BLD-MCNC: 104 NMOL/L (ref 70–180)

## 2019-11-22 ENCOUNTER — TELEPHONE (OUTPATIENT)
Dept: NEUROLOGY | Facility: CLINIC | Age: 39
End: 2019-11-22

## 2019-12-31 ENCOUNTER — OFFICE VISIT (OUTPATIENT)
Dept: NEUROLOGY | Facility: CLINIC | Age: 39
End: 2019-12-31
Payer: COMMERCIAL

## 2019-12-31 VITALS
OXYGEN SATURATION: 97 % | SYSTOLIC BLOOD PRESSURE: 102 MMHG | WEIGHT: 166.25 LBS | DIASTOLIC BLOOD PRESSURE: 68 MMHG | HEART RATE: 83 BPM | TEMPERATURE: 98.1 F | BODY MASS INDEX: 31.41 KG/M2

## 2019-12-31 DIAGNOSIS — R41.9 COGNITIVE COMPLAINTS: Primary | ICD-10-CM

## 2019-12-31 PROCEDURE — 99214 OFFICE O/P EST MOD 30 MIN: CPT | Performed by: PSYCHIATRY & NEUROLOGY

## 2019-12-31 NOTE — PATIENT INSTRUCTIONS
AFTER VISIT SUMMARY (AVS):    At today's visit we thoroughly discussed current symptoms, work-up results (unrevealing), and the plan.  We will repeat your cognitive testing approximately 6 months from now unless your symptoms significantly worsen.    Next follow-up appointment is in the next 6 months or earlier if needed.    Please do not hesitate to call me with any questions or concerns.    Thanks.

## 2019-12-31 NOTE — LETTER
12/31/2019         RE: Elsie Gottlieb  [address]  [place] [address]        Dear Colleague,    Thank you for referring your patient, Elsie Gottlieb, to the [facility]. Please see a copy of my visit note below.    ESTABLISHED PATIENT NEUROLOGY NOTE    DATE OF VISIT: 12/31/2019  CLINIC LOCATION: [facility]  MRN: [id number]  PATIENT NAME: Elsie Gottlieb  YOB: 1980    PCP: Scott Hollingsworth MD    REASON FOR VISIT:   Chief Complaint   Patient presents with     Follow Up     MRI      SUBJECTIVE:                                                      HISTORY OF PRESENT ILLNESS: Patient is here for follow up regarding memory difficulty. Please refer to my initial note from 11/13/2019 for further information.    Since the last visit, the patient reports no significant changes in her cognitive complaints, though thinks that it is better with reduced stress.  Denies interval development of new neurological symptoms.    Labs, performed at the initial visit, including TSH (1.2) and vitamin B1 (104), were unrevealing.  Brain MRI from 11/15/2019 was unremarkable.    On review of systems, patient endorses no additional active complaints. Medications, allergies, family and social history were also reviewed. There are no changes reported by patient.  REVIEW OF SYSTEMS:                                                    10-system review was completed. Pertinent positives are included in HPI. The remainder of ROS is negative.  EXAM:                                                    Physical Exam:   Vitals: /68 (BP Location: Left arm, Patient Position: Sitting, Cuff Size: Adult Regular)   Pulse 83   Temp 98.1  F (36.7  C) (Oral)   Wt 75.4 kg (166 lb 4 oz)   SpO2 97%   BMI 31.41 kg/m       General: pt is in NAD, cooperative.  Skin: normal turgor, moist mucous membranes, no lesions/rashes noticed.  HEENT: ATNC, white sclera, normal conjunctiva.  Respiratory:  Symmetric lung excursion, no accessory respiratory muscle use.  Abdomen: Non distended.  Neurological: awake, cooperative, follows commands, no aphasia or dysarthria noted, cranial nerves II-XII: no ptosis, extraocular motility is full, face is symmetric, tongue is midline, equally moves all extremities, no dysmetria bilaterally, casual gait is normal.  DATA:   Labs/Imaging: I personally reviewed pertinent labs and brain MRI images, as detailed in history of present illness.  ASSESSMENT AND PLAN:                                                    Assessment: 39-year-old female patient with memory concerns presents for follow-up after recommended work-up, including labs and brain MRI, which was unrevealing.  I discussed results with the patient in details.  We reviewed the MRI images together.  I would like to observe her symptoms for interval changes and retest her cognition in approximately 6 months from now.  Meanwhile, she was advised to address her elevated anxiety/mental health conditions.    Diagnoses:    ICD-10-CM    1. Cognitive complaints R41.9      Plan: At today's visit we thoroughly discussed current symptoms, work-up results (unrevealing), and the plan.  We will repeat her cognitive testing approximately 6 months from now unless her symptoms significantly worsen.    Next follow-up appointment is in the next 6 months or earlier if needed.    Total Time: 26 minutes with > 50% spent counseling the patient on stated above assessment and recommendations.    Jose Pete MD  / Neurology      Again, thank you for allowing me to participate in the care of your patient.        Sincerely,        Jose Pete MD

## 2019-12-31 NOTE — PROGRESS NOTES
ESTABLISHED PATIENT NEUROLOGY NOTE    DATE OF VISIT: 12/31/2019  CLINIC LOCATION: [facility]  MRN: [id number]  PATIENT NAME: Elsie Gottlieb  YOB: 1980    PCP: Scott Hollingsworth MD    REASON FOR VISIT:   Chief Complaint   Patient presents with     Follow Up     MRI      SUBJECTIVE:                                                      HISTORY OF PRESENT ILLNESS: Patient is here for follow up regarding memory difficulty. Please refer to my initial note from 11/13/2019 for further information.    Since the last visit, the patient reports no significant changes in her cognitive complaints, though thinks that it is better with reduced stress.  Denies interval development of new neurological symptoms.    Labs, performed at the initial visit, including TSH (1.2) and vitamin B1 (104), were unrevealing.  Brain MRI from 11/15/2019 was unremarkable.    On review of systems, patient endorses no additional active complaints. Medications, allergies, family and social history were also reviewed. There are no changes reported by patient.  REVIEW OF SYSTEMS:                                                    10-system review was completed. Pertinent positives are included in HPI. The remainder of ROS is negative.  EXAM:                                                    Physical Exam:   Vitals: /68 (BP Location: Left arm, Patient Position: Sitting, Cuff Size: Adult Regular)   Pulse 83   Temp 98.1  F (36.7  C) (Oral)   Wt 75.4 kg (166 lb 4 oz)   SpO2 97%   BMI 31.41 kg/m      General: pt is in NAD, cooperative.  Skin: normal turgor, moist mucous membranes, no lesions/rashes noticed.  HEENT: ATNC, white sclera, normal conjunctiva.  Respiratory: Symmetric lung excursion, no accessory respiratory muscle use.  Abdomen: Non distended.  Neurological: awake, cooperative, follows commands, no aphasia or dysarthria noted, cranial nerves II-XII: no ptosis, extraocular motility is full, face is symmetric,  tongue is midline, equally moves all extremities, no dysmetria bilaterally, casual gait is normal.  DATA:   Labs/Imaging: I personally reviewed pertinent labs and brain MRI images, as detailed in history of present illness.  ASSESSMENT AND PLAN:                                                    Assessment: 39-year-old female patient with memory concerns presents for follow-up after recommended work-up, including labs and brain MRI, which was unrevealing.  I discussed results with the patient in details.  We reviewed the MRI images together.  I would like to observe her symptoms for interval changes and retest her cognition in approximately 6 months from now.  Meanwhile, she was advised to address her elevated anxiety/mental health conditions.    Diagnoses:    ICD-10-CM    1. Cognitive complaints R41.9      Plan: At today's visit we thoroughly discussed current symptoms, work-up results (unrevealing), and the plan.  We will repeat her cognitive testing approximately 6 months from now unless her symptoms significantly worsen.    Next follow-up appointment is in the next 6 months or earlier if needed.    Total Time: 26 minutes with > 50% spent counseling the patient on stated above assessment and recommendations.    Jose Pete MD  / Neurology

## 2020-03-31 ENCOUNTER — OFFICE VISIT (OUTPATIENT)
Dept: INTERNAL MEDICINE | Facility: CLINIC | Age: 40
End: 2020-03-31
Payer: COMMERCIAL

## 2020-03-31 VITALS
BODY MASS INDEX: 32 KG/M2 | RESPIRATION RATE: 14 BRPM | HEART RATE: 66 BPM | WEIGHT: 169.5 LBS | OXYGEN SATURATION: 98 % | TEMPERATURE: 97.7 F | DIASTOLIC BLOOD PRESSURE: 64 MMHG | HEIGHT: 61 IN | SYSTOLIC BLOOD PRESSURE: 108 MMHG

## 2020-03-31 DIAGNOSIS — R30.0 DYSURIA: Primary | ICD-10-CM

## 2020-03-31 DIAGNOSIS — R82.90 NONSPECIFIC FINDING ON EXAMINATION OF URINE: ICD-10-CM

## 2020-03-31 LAB
ALBUMIN UR-MCNC: NEGATIVE MG/DL
APPEARANCE UR: CLEAR
BILIRUB UR QL STRIP: NEGATIVE
COLOR UR AUTO: YELLOW
GLUCOSE UR STRIP-MCNC: NEGATIVE MG/DL
HGB UR QL STRIP: ABNORMAL
KETONES UR STRIP-MCNC: NEGATIVE MG/DL
LEUKOCYTE ESTERASE UR QL STRIP: ABNORMAL
NITRATE UR QL: NEGATIVE
PH UR STRIP: 7 PH (ref 5–7)
RBC #/AREA URNS AUTO: ABNORMAL /HPF
SOURCE: ABNORMAL
SP GR UR STRIP: 1.01 (ref 1–1.03)
UROBILINOGEN UR STRIP-ACNC: 0.2 EU/DL (ref 0.2–1)
WBC #/AREA URNS AUTO: ABNORMAL /HPF

## 2020-03-31 PROCEDURE — 87086 URINE CULTURE/COLONY COUNT: CPT | Performed by: INTERNAL MEDICINE

## 2020-03-31 PROCEDURE — 81001 URINALYSIS AUTO W/SCOPE: CPT | Performed by: INTERNAL MEDICINE

## 2020-03-31 PROCEDURE — 99213 OFFICE O/P EST LOW 20 MIN: CPT | Performed by: INTERNAL MEDICINE

## 2020-03-31 RX ORDER — SULFAMETHOXAZOLE/TRIMETHOPRIM 800-160 MG
1 TABLET ORAL 2 TIMES DAILY
Qty: 10 TABLET | Refills: 0 | Status: SHIPPED | OUTPATIENT
Start: 2020-03-31

## 2020-03-31 ASSESSMENT — MIFFLIN-ST. JEOR: SCORE: 1366.23

## 2020-03-31 NOTE — PROGRESS NOTES
Subjective     Elsie Gottlieb is a 40 year old female who presents to clinic today for the following health issues:    HPI   URINARY TRACT SYMPTOMS  Patient states that she has been treated in the past for urinary tract infections although review of the chart demonstrates she was last seen for this many years ago and treated with Bactrim DS with resolution.  She denies any vaginal discharge.  She denies any flank pain or fever.      Duration: Began yesterday     Description  dysuria, frequency, urgency, hesitancy and retention    Intensity:  moderate    Accompanying signs and symptoms:  Fever/chills: no   Flank pain no   Nausea and vomiting: no   Vaginal symptoms: none  Abdominal/Pelvic Pain: YES- pelvic pressure     History  History of frequent UTI's: no   History of kidney stones: no   Sexually Active: YES  Possibility of pregnancy: No    Precipitating or alleviating factors: None    Therapies tried and outcome: none       Patient Active Problem List   Diagnosis     Hyperlipidemia LDL goal <160     PPD positive     Closed fracture of metatarsal bone     Acute appendicitis with localized peritonitis     Past Surgical History:   Procedure Laterality Date     CHOLECYSTECTOMY  8/2011     GYN SURGERY  6/2011    tubal ligation     LAPAROSCOPIC APPENDECTOMY N/A 10/14/2015    Procedure: LAPAROSCOPIC APPENDECTOMY;  Surgeon: Wicho Segal MD;  Location:  OR       Social History     Tobacco Use     Smoking status: Never Smoker     Smokeless tobacco: Never Used   Substance Use Topics     Alcohol use: No     Alcohol/week: 0.0 standard drinks     Family History   Problem Relation Age of Onset     Medical History Unknown Mother      Medical History Unknown Father      Asthma Maternal Grandmother          Current Outpatient Medications   Medication Sig Dispense Refill     atorvastatin (LIPITOR) 40 MG tablet Take 1 tablet (40 mg) by mouth daily 90 tablet 3     multivitamin, therapeutic with minerals (THERA-VIT-M) TABS Take 1  "tablet by mouth daily       No Known Allergies  BP Readings from Last 3 Encounters:   03/31/20 108/64   12/31/19 102/68   11/13/19 110/60    Wt Readings from Last 3 Encounters:   03/31/20 76.9 kg (169 lb 8 oz)   12/31/19 75.4 kg (166 lb 4 oz)   11/13/19 76.7 kg (169 lb)            Reviewed and updated as needed this visit by Provider         Review of Systems   ROS COMP: CONSTITUTIONAL: NEGATIVE for fever, chills, change in weight  ENT/MOUTH: NEGATIVE for ear, mouth and throat problems  RESP: NEGATIVE for significant cough or SOB  CV: NEGATIVE for chest pain, palpitations or peripheral edema  GI: NEGATIVE for nausea, abdominal pain, heartburn, or change in bowel habits  MUSCULOSKELETAL: NEGATIVE for significant arthralgias or myalgia  NEURO: NEGATIVE for weakness, dizziness or paresthesias  HEME: NEGATIVE for bleeding problems  PSYCHIATRIC: NEGATIVE for changes in mood or affect      Objective    /64   Pulse 66   Temp 97.7  F (36.5  C) (Tympanic)   Resp 14   Ht 1.549 m (5' 1\")   Wt 76.9 kg (169 lb 8 oz)   LMP 03/10/2020 (Approximate)   SpO2 98%   Breastfeeding No   BMI 32.03 kg/m    Body mass index is 32.03 kg/m .  Physical Exam   GENERAL: healthy, alert and no distress  RESP: lungs clear to auscultation - no rales, rhonchi or wheezes  CV: regular rate and rhythm, normal S1 S2, no S3 or S4, no click or rub, no peripheral edema and peripheral pulses strong  ABDOMEN: soft, nontender, no hepatosplenomegaly, no masses and bowel sounds normal  MS: no gross musculoskeletal defects noted, no edema  PSYCH: mentation appears normal, affect normal/bright          Assessment & Plan     1. Dysuria  Urinalysis reviewed and appears consistent with potential UTI with culture pending.  Suggest starting Bactrim DS 1 p.o. twice daily x5 days as patient has been on medication in the past for similar symptoms  - *UA reflex to Microscopic and Culture ([place] [facility] (except [place] and [place])  - " "sulfamethoxazole-trimethoprim (BACTRIM DS) 800-160 MG tablet; Take 1 tablet by mouth 2 times daily  Dispense: 10 tablet; Refill: 0  - Urine Microscopic    Patient was advised that we will contact her if any change in therapy is needed based on culture results    2. Nonspecific finding on examination of urine  With awaiting culture results  - Urine Culture Aerobic Bacterial     BMI:   Estimated body mass index is 32.03 kg/m  as calculated from the following:    Height as of this encounter: 1.549 m (5' 1\").    Weight as of this encounter: 76.9 kg (169 lb 8 oz).     See Patient Instructions    Return in about 1 week (around 4/7/2020) for if symptoms recur or worsen.    Efren Zelaya MD  [facility]      "

## 2020-04-01 LAB
BACTERIA SPEC CULT: NORMAL
SPECIMEN SOURCE: NORMAL

## 2020-11-23 DIAGNOSIS — E78.5 HYPERLIPIDEMIA LDL GOAL <160: Primary | ICD-10-CM

## 2020-11-23 DIAGNOSIS — E78.5 HYPERLIPIDEMIA LDL GOAL <100: ICD-10-CM

## 2020-11-23 RX ORDER — ATORVASTATIN CALCIUM 40 MG/1
40 TABLET, FILM COATED ORAL DAILY
Qty: 30 TABLET | Refills: 0 | Status: SHIPPED | OUTPATIENT
Start: 2020-11-23 | End: 2020-12-22

## 2020-11-23 NOTE — TELEPHONE ENCOUNTER
Refilled x1.  Pended FLP lab order for further refills.    Kiran Cueto, PharmD  [facility]   [facility] Pharmacist on behalf of [facility]

## 2020-12-22 DIAGNOSIS — E78.5 HYPERLIPIDEMIA LDL GOAL <100: ICD-10-CM

## 2020-12-22 RX ORDER — ATORVASTATIN CALCIUM 40 MG/1
TABLET, FILM COATED ORAL
Qty: 30 TABLET | Refills: 0 | Status: SHIPPED | OUTPATIENT
Start: 2020-12-22 | End: 2021-01-18

## 2021-01-16 DIAGNOSIS — E78.5 HYPERLIPIDEMIA LDL GOAL <100: ICD-10-CM

## 2021-01-17 NOTE — TELEPHONE ENCOUNTER
"Routing refill request to provider for review/approval because:  Labs not current:  ldl    Requested Prescriptions   Pending Prescriptions Disp Refills     atorvastatin (LIPITOR) 40 MG tablet [Pharmacy Med Name: ATORVASTATIN CALCIUM 40MG TABS] 30 tablet 0     Sig: TAKE ONE TABLET BY MOUTH ONCE DAILY. NEED FASTING LABS FOR FURTHER REFILLS       Statins Protocol Failed - 1/16/2021  5:02 AM        Failed - LDL on file in past 12 months     Recent Labs   Lab Test 09/13/19  1028   *             Passed - No abnormal creatine kinase in past 12 months     No lab results found.             Passed - Recent (12 mo) or future (30 days) visit within the authorizing provider's specialty     Patient has had an office visit with the authorizing provider or a provider within the authorizing providers department within the previous 12 mos or has a future within next 30 days. See \"Patient Info\" tab in inbasket, or \"Choose Columns\" in Meds & Orders section of the refill encounter.              Passed - Medication is active on med list        Passed - Patient is age 18 or older        Passed - No active pregnancy on record        Passed - No positive pregnancy test in past 12 months                   "

## 2021-01-18 RX ORDER — ATORVASTATIN CALCIUM 40 MG/1
TABLET, FILM COATED ORAL
Qty: 30 TABLET | Refills: 0 | Status: SHIPPED | OUTPATIENT
Start: 2021-01-18 | End: 2021-02-18

## 2021-02-17 DIAGNOSIS — E78.5 HYPERLIPIDEMIA LDL GOAL <100: ICD-10-CM

## 2021-02-17 NOTE — TELEPHONE ENCOUNTER
Routing refill request to provider for review/approval because:  Labs not current:  LDL    LDL Cholesterol Calculated   Date Value Ref Range Status   09/13/2019 233 (H) <100 mg/dL Final     Comment:     Above desirable:  100-129 mg/dl  Borderline High:  130-159 mg/dL  High:             160-189 mg/dL  Very high:       >189 mg/dl

## 2021-02-18 RX ORDER — ATORVASTATIN CALCIUM 40 MG/1
TABLET, FILM COATED ORAL
Qty: 30 TABLET | Refills: 0 | Status: SHIPPED | OUTPATIENT
Start: 2021-02-18 | End: 2021-03-23

## 2021-03-19 DIAGNOSIS — E78.5 HYPERLIPIDEMIA LDL GOAL <100: ICD-10-CM

## 2021-03-19 NOTE — LETTER
[facility]  [address] [address]  Phone: [phone]       March 23, 2021         Elsie Gottlieb  [address]  [facility] [address]            Dear Elsie:    We are concerned about your health care.  We recently provided you with medication refills.  Many medications require routine follow-up with your doctor.Please schedule fasting lab appt  So we can give you more refills of your medication.     Your prescription(s) have been refilled for 30 days so you may have time for the above noted follow-up. Please call to schedule soon so we can assure you have an appointment before your next refills are needed.    Thank you,      Scott Hollingsworth MD/ Internal Medicine

## 2021-03-23 RX ORDER — ATORVASTATIN CALCIUM 40 MG/1
TABLET, FILM COATED ORAL
Qty: 30 TABLET | Refills: 0 | Status: SHIPPED | OUTPATIENT
Start: 2021-03-23 | End: 2021-04-15

## 2021-03-23 NOTE — CONFIDENTIAL NOTE
Prescription approved per [facility] Refill Protocol.  Letter mailed due for fasting labs

## 2021-04-09 DIAGNOSIS — E78.5 HYPERLIPIDEMIA LDL GOAL <160: ICD-10-CM

## 2021-04-09 LAB
CHOLEST SERPL-MCNC: 193 MG/DL
HDLC SERPL-MCNC: 51 MG/DL
LDLC SERPL CALC-MCNC: 117 MG/DL
NONHDLC SERPL-MCNC: 142 MG/DL
TRIGL SERPL-MCNC: 124 MG/DL

## 2021-04-09 PROCEDURE — 80061 LIPID PANEL: CPT | Performed by: INTERNAL MEDICINE

## 2021-04-09 PROCEDURE — 36415 COLL VENOUS BLD VENIPUNCTURE: CPT | Performed by: INTERNAL MEDICINE

## 2021-04-15 DIAGNOSIS — E78.5 HYPERLIPIDEMIA LDL GOAL <100: ICD-10-CM

## 2021-04-15 RX ORDER — ATORVASTATIN CALCIUM 40 MG/1
TABLET, FILM COATED ORAL
Qty: 30 TABLET | Refills: 0 | Status: SHIPPED | OUTPATIENT
Start: 2021-04-15

## 2021-05-14 DIAGNOSIS — E78.5 HYPERLIPIDEMIA LDL GOAL <100: ICD-10-CM

## 2021-05-14 RX ORDER — ATORVASTATIN CALCIUM 40 MG/1
TABLET, FILM COATED ORAL
Qty: 30 TABLET | Refills: 0 | OUTPATIENT
Start: 2021-05-14

## 2021-05-14 NOTE — LETTER
[facility]  [address] [id number] [phone]            Elsie Gottlieb  [address]  [place] [facility] [address]        May 19, 2021    Dear Elsie,    While refilling your prescription today, we noticed that you are due for an appointment with your provider.  A follow-up appointment must be made before any additional refills can be approved.     Taking care of your health is important to us and we look forward to seeing you in the near future.  Please call us at [phone] or [phone] (or use [employer]) to schedule an appointment.     Please disregard this notice if you have already made an appointment.    Sincerely,        [facility]

## 2021-05-14 NOTE — TELEPHONE ENCOUNTER
Routing refill request to provider for review/approval because:  Colleen given x1 and patient did not follow up, please advise  Labs not current:  Lipids  Patient needs to be seen because it has been more than 1 year since last office visit.

## 2021-05-17 RX ORDER — ATORVASTATIN CALCIUM 40 MG/1
TABLET, FILM COATED ORAL
Qty: 30 TABLET | Refills: 0 | OUTPATIENT
Start: 2021-05-17

## 2021-05-19 NOTE — TELEPHONE ENCOUNTER
Letter sent to pt to make an appointment with PCP before medication for atorvastatin can be approved.